# Patient Record
Sex: FEMALE | Race: WHITE | Employment: FULL TIME | URBAN - METROPOLITAN AREA
[De-identification: names, ages, dates, MRNs, and addresses within clinical notes are randomized per-mention and may not be internally consistent; named-entity substitution may affect disease eponyms.]

---

## 2017-06-02 DIAGNOSIS — R61 GENERALIZED HYPERHIDROSIS: ICD-10-CM

## 2017-06-02 NOTE — LETTER
[facility]                                             [address] [place]  [address]    June 6, 2017    Alexandra Reyna  [address] [facility] [address]    Dear Alexandra,       We recently received a refill request for Drysol. .  We have refilled this for a one time 90 day supply only because you are due for a:    physical office visit      Please call at your earliest convenience so that there will not be a delay with your future refills.          Thank you,   Your [facility] Care Team  [phone]

## 2017-06-02 NOTE — TELEPHONE ENCOUNTER
Pending Prescriptions:                       Disp   Refills    aluminum chloride (DRYSOL) 20 % external *35 mL  prn          Lynne Bravo MA

## 2017-07-06 ENCOUNTER — TELEPHONE (OUTPATIENT)
Dept: PEDIATRICS | Facility: CLINIC | Age: 22
End: 2017-07-06

## 2017-07-06 NOTE — TELEPHONE ENCOUNTER
Please call the patient. She has questions about her immunizations and if she is missing anything prior to starting school.     Thank you.

## 2017-07-07 NOTE — TELEPHONE ENCOUNTER
Schedule patient for Mantoux and second chicken pox per Patient. Patients will mention to Nurse at the time of visit about the two step testing for  mantoux.    Yadi Bhatt MA

## 2017-07-11 ENCOUNTER — ALLIED HEALTH/NURSE VISIT (OUTPATIENT)
Dept: NURSING | Facility: CLINIC | Age: 22
End: 2017-07-11
Payer: COMMERCIAL

## 2017-07-11 DIAGNOSIS — Z11.1 SCREENING EXAMINATION FOR PULMONARY TUBERCULOSIS: ICD-10-CM

## 2017-07-11 DIAGNOSIS — Z02.0 ENCOUNTER FOR SCHOOL EXAMINATION: Primary | ICD-10-CM

## 2017-07-11 PROCEDURE — 86480 TB TEST CELL IMMUN MEASURE: CPT | Performed by: FAMILY MEDICINE

## 2017-07-11 PROCEDURE — 99207 ZZC NO CHARGE NURSE ONLY: CPT

## 2017-07-11 PROCEDURE — 36415 COLL VENOUS BLD VENIPUNCTURE: CPT | Performed by: FAMILY MEDICINE

## 2017-07-11 NOTE — MR AVS SNAPSHOT
After Visit Summary   7/11/2017    Alexandra Reyna    MRN: [id number]           Patient Information     Date Of Birth          1995        Visit Information        Provider Department      7/11/2017 9:00 AM AN ANCILLARY [facility]        Today's Diagnoses     Encounter for school examination    -  1    Screening examination for pulmonary tuberculosis           Follow-ups after your visit        Your next 10 appointments already scheduled     Jul 13, 2017  9:00 AM CDT   Nurse Only with AN ANCILLARY   [facility]    [address] [facility] [address] [phone]              Who to contact     If you have questions or need follow up information about today's clinic visit or your schedule please contact [facility] directly at [phone].  Normal or non-critical lab and imaging results will be communicated to you by MyChart, letter or phone within 4 business days after the clinic has received the results. If you do not hear from us within 7 days, please contact the clinic through Chumbakhart or phone. If you have a critical or abnormal lab result, we will notify you by phone as soon as possible.  Submit refill requests through Lailaihui or call your pharmacy and they will forward the refill request to us. Please allow 3 business days for your refill to be completed.          Additional Information About Your Visit        MyChart Information     Lail[employer]hui gives you secure access to your electronic health record. If you see a primary care provider, you can also send messages to your care team and make appointments. If you have questions, please call your primary care clinic.  If you do not have a primary care provider, please call [phone] and they will assist you.        Care EveryWhere ID     This is your Care EveryWhere ID. This could be used by other organizations to access your [facility] medical records  [id number]          Blood Pressure from Last 3 Encounters:   08/30/16 105/66   05/23/16 119/76   11/10/15 119/79    Weight from Last 3 Encounters:   08/30/16 114 lb (51.7 kg)   05/23/16 114 lb (51.7 kg)   11/10/15 105 lb (47.6 kg) (8 %)*     * Growth percentiles are based on [facility] 2-20 Years data.              We Performed the Following     M Tuberculosis by Quantiferon        Primary Care Provider Office Phone # Fax #    Suha Wu [phone]       [facility] [address] [facility] [address]        Equal Access to Services     JASON HERNANDEZ : Hadii citlaly Little, wajosefda luemiladaha, qaybta kaalmada adedeisi, lester aden . So [facility] [phone].    ATENCIÓN: Si habla español, tiene a valles disposición servicios gratuitos de asistencia lingüística. Llame al [phone].    We comply with applicable federal civil rights laws and [place] laws. We do not discriminate on the basis of race, color, national origin, age, disability sex, sexual orientation or gender identity.            Thank you!     Thank you for choosing [facility]  for your care. Our goal is always to provide you with excellent care. Hearing back from our patients is one way we can continue to improve our services. Please take a few minutes to complete the written survey that you may receive in the mail after your visit with us. Thank you!             Your Updated Medication List - Protect others around you: Learn how to safely use, store and throw away your medicines at www.disposemymeds.org.          This list is accurate as of: 7/11/17 10:18 AM.  Always use your most recent med list.                   Brand Name Dispense Instructions for use Diagnosis    aluminum chloride 20 % external solution    DRYSOL    35 mL    Apply  topically At Bedtime.    Generalized hyperhidrosis       Aluminum Chloride in Alcohol 6.25 % Soln     1 Bottle    Externally apply topically nightly as  needed    Generalized hyperhidrosis       ciclopirox 8 % Soln     1 Bottle    Apply every day x 8 weeks.  First day of each week, use nail file to remove old medication and roughen nail surface.    Onychomycosis       drospirenone-ethinyl estradiol 3-0.03 MG per tablet    JOSE    84 tablet    Take 1 tablet by mouth daily    Anovulation

## 2017-07-11 NOTE — NURSING NOTE
Patient needed TB GOLD for nursing school. Printed all her titers for her as well. Patient said she got her second Varivax at [facility] as an employee. I told her needed to call her HR and get the record.  I will call her when TB GOLD test results are back.  Rosalba Hodges MA

## 2017-07-12 LAB
M TB TUBERC IFN-G BLD QL: NEGATIVE
M TB TUBERC IFN-G/MITOGEN IGNF BLD: 0 IU/ML

## 2017-08-15 ENCOUNTER — OFFICE VISIT (OUTPATIENT)
Dept: OBGYN | Facility: CLINIC | Age: 22
End: 2017-08-15
Payer: COMMERCIAL

## 2017-08-15 VITALS
BODY MASS INDEX: 18.84 KG/M2 | WEIGHT: 117.2 LBS | HEART RATE: 100 BPM | TEMPERATURE: 98.1 F | HEIGHT: 66 IN | DIASTOLIC BLOOD PRESSURE: 72 MMHG | SYSTOLIC BLOOD PRESSURE: 110 MMHG

## 2017-08-15 DIAGNOSIS — Z01.419 ENCOUNTER FOR GYNECOLOGICAL EXAMINATION WITHOUT ABNORMAL FINDING: Primary | ICD-10-CM

## 2017-08-15 DIAGNOSIS — N97.0 ANOVULATION: ICD-10-CM

## 2017-08-15 DIAGNOSIS — Z30.09 BIRTH CONTROL COUNSELING: ICD-10-CM

## 2017-08-15 PROCEDURE — 99395 PREV VISIT EST AGE 18-39: CPT | Performed by: NURSE PRACTITIONER

## 2017-08-15 PROCEDURE — G0145 SCR C/V CYTO,THINLAYER,RESCR: HCPCS | Performed by: NURSE PRACTITIONER

## 2017-08-15 RX ORDER — DROSPIRENONE AND ETHINYL ESTRADIOL 0.03MG-3MG
1 KIT ORAL DAILY
Qty: 84 TABLET | Refills: 3 | Status: SHIPPED | OUTPATIENT
Start: 2017-08-15 | End: 2018-07-12

## 2017-08-15 ASSESSMENT — PAIN SCALES - GENERAL: PAINLEVEL: NO PAIN (0)

## 2017-08-15 NOTE — MR AVS SNAPSHOT
"              After Visit Summary   8/15/2017    Alexandra Reyna    MRN: [id number]           Patient Information     Date Of Birth          1995        Visit Information        Provider Department      8/15/2017 8:50 AM Julia Rojas APRN CNP [facility]        Today's Diagnoses     Encounter for gynecological examination without abnormal finding    -  1    Anovulation        Birth control counseling           Follow-ups after your visit        Who to contact     If you have questions or need follow up information about today's clinic visit or your schedule please contact [facility] directly at [phone].  Normal or non-critical lab and imaging results will be communicated to you by MyChart, letter or phone within 4 business days after the clinic has received the results. If you do not hear from us within 7 days, please contact the clinic through Inaurahart or phone. If you have a critical or abnormal lab result, we will notify you by phone as soon as possible.  Submit refill requests through [employer] or call your pharmacy and they will forward the refill request to us. Please allow 3 business days for your refill to be completed.          Additional Information About Your Visit        MyChart Information     [employer] gives you secure access to your electronic health record. If you see a primary care provider, you can also send messages to your care team and make appointments. If you have questions, please call your primary care clinic.  If you do not have a primary care provider, please call [phone] and they will assist you.        Care EveryWhere ID     This is your Care EveryWhere ID. This could be used by other organizations to access your [facility] medical records  [id number]        Your Vitals Were     Pulse Temperature Height Last Period BMI (Body Mass Index)       100 98.1  F (36.7  C) (Oral) 5' 5.5\" (1.664 m) 07/29/2017 (Exact Date) 19.21 kg/m2        Blood " Pressure from Last 3 Encounters:   08/15/17 110/72   08/30/16 105/66   05/23/16 119/76    Weight from Last 3 Encounters:   08/15/17 117 lb 3.2 oz (53.2 kg)   08/30/16 114 lb (51.7 kg)   05/23/16 114 lb (51.7 kg)              We Performed the Following     Pap imaged thin layer screen only - recommended age 21 - 24 years          Today's Medication Changes          These changes are accurate as of: 8/15/17  9:27 AM.  If you have any questions, ask your nurse or doctor.               Stop taking these medicines if you haven't already. Please contact your care team if you have questions.     ciclopirox 8 % Soln   Stopped by:  Julia Rojas APRN CNP                Where to get your medicines      These medications were sent to [facility] - [address] [address]     Phone:  [phone]     drospirenone-ethinyl estradiol 3-0.03 MG per tablet                Primary Care Provider Office Phone # Fax #    Suha Wu [phone] [address] [facility] [address]        Equal Access to Services     JASON HERNANDEZ : Hadii citlaly hanson hadasho Soomaali, waaxda luqadaha, qaybta kaalmada adeegyada, lester rosas. So [facility] [phone].    ATENCIÓN: Si habla español, tiene a valles disposición servicios gratuitos de asistencia lingüística. Llame al [phone].    We comply with applicable federal civil rights laws and [place] laws. We do not discriminate on the basis of race, color, national origin, age, disability sex, sexual orientation or gender identity.            Thank you!     Thank you for choosing [facility]  for your care. Our goal is always to provide you with excellent care. Hearing back from our patients is one way we can continue to improve our services. Please take a few minutes to complete the written survey that you may receive in the mail after your visit with us. Thank you!              Your Updated Medication List - Protect others around you: Learn how to safely use, store and throw away your medicines at www.disposemymeds.org.          This list is accurate as of: 8/15/17  9:27 AM.  Always use your most recent med list.                   Brand Name Dispense Instructions for use Diagnosis    aluminum chloride 20 % external solution    DRYSOL    35 mL    Apply  topically At Bedtime.    Generalized hyperhidrosis       Aluminum Chloride in Alcohol 6.25 % Soln     1 Bottle    Externally apply topically nightly as needed    Generalized hyperhidrosis       drospirenone-ethinyl estradiol 3-0.03 MG per tablet    JOSE    84 tablet    Take 1 tablet by mouth daily    Anovulation, Birth control counseling

## 2017-08-15 NOTE — NURSING NOTE
"Chief Complaint   Patient presents with     Physical       Initial /72  Pulse 100  Temp 98.1  F (36.7  C) (Oral)  Ht 5' 5.5\" (1.664 m)  Wt 117 lb 3.2 oz (53.2 kg)  LMP 07/29/2017 (Exact Date)  BMI 19.21 kg/m2 Estimated body mass index is 19.21 kg/(m^2) as calculated from the following:    Height as of this encounter: 5' 5.5\" (1.664 m).    Weight as of this encounter: 117 lb 3.2 oz (53.2 kg)..  BP completed using cuff size: caryn Fletcher CMA    "

## 2017-08-15 NOTE — PROGRESS NOTES
S: Pt is a 22 year old  0 para 0 who presents today for an annual female exam. LMP: 17. Contraception: Pill. Started oral contraceptive pills for management of anovulation and now also using for contraception. Not great at remembering pills regularly, wants to discuss alternate options.  Declines STD screening. Last Pap smear: no previous. Immunizations reviewed. Dental Exams: regular. Diet and calcium reviewed. Exercise: nothing regular.    Past Medical History:   Diagnosis Date     Anovulation      Past Surgical History:   Procedure Laterality Date     C ORAL SURGERY PROCEDURE      [place] Teeth-bottom 2 only     Social History   Substance Use Topics     Smoking status: Never Smoker     Smokeless tobacco: Never Used     Alcohol use No         REVIEW OF SYSTEMS:  CONSTITUTIONAL:NEGATIVE for fever, chills, change in weight  EYES: NEGATIVE for vision changes or irritation  ENT/MOUTH: NEGATIVE for ear, mouth and throat problems  RESP:NEGATIVE for significant cough or SOB  CV: NEGATIVE for chest pain, palpitations or peripheral edema  GI: NEGATIVE for nausea, abdominal pain, heartburn, or change in bowel habits  Periods are regular on oral contraceptive pills, Cyclic symptoms include none. No intermenstrual bleeding.  MUSCULOSKELATAL:NEGATIVE for significant arthralgias or myalgia  INTEGUMENTARY/SKIN: NEGATIVE for worrisome rashes, moles or lesions  NEURO: NEGATIVE for weakness, dizziness or paresthesias  ENDOCRINE: NEGATIVE for temperature intolerance, skin/hair changes  HEME/ALLERGY/IMMUNE: NEGATIVE for bleeding problems  PSYCHIATRIC: NEGATIVE for changes in mood or affect      OBJECTIVE: This is a well appearing female in no acute distress. Answers questions and maintains eye contact appropriately. Vital signs noted.     EXAM:  EYES: Eyes grossly normal to inspection, PERRL and conjunctivae and sclerae normal  HENT: ear canals and TM's normal and nose and mouth without ulcers or lesions  NECK: no  adenopathy, no asymmetry, masses, or scars and thyroid normal to palpation  RESP: lungs clear to auscultation - no rales, rhonchi or wheezes  CV: regular rates and rhythm, normal S1 S2, no S3 or S4 and no murmur, click or rub  LYMPH: normal ant/post cervical and supraclavicular nodes  ABD/GI: soft, nontender, without hepatosplenomegaly or masses  MS: extremities normal- no gross deformities noted  SKIN: no suspicious lesions or rashes  NEURO: Normal strength and tone, mentation intact and speech normal  PSYCH: mentation appears normal and affect normal/bright  Breast exam: Breasts are symmetrical without masses, lymphadenopathy, retraction, dimpling, or nipple discharge bilaterally.  Pelvic Exam: External genitalia without visible lesions or discharge. Normal BUS. Vaginal mucosa pink, rugated, moist, without lesions or discharge. Cervix is pink, nulliparous, midline, without cervical motion tenderness. Pap smear is obtained. Uterus normal size and shape without tenderness or masses. Adnexa without masses or tenderness bilaterally.    A/P:  1) Normal annual female exam. Health maintenance updated. Regular physical activity and healthy diet encouraged. Continue regular dental exams. Encouraged adequate calcium intake.  2) Contraception Counseling. Using oral contraceptive pills also for management of anovulatory cycles. We reviewed alternate options including oral contraceptive pills, transdermal patches, vaginal ring, Depo Provera, Nexplanon. At this time she is unsure how she would like to proceed. Will send prescription for refill on oral contraceptive pills for 1 year, if alternate desired, will call. She is most interested in Depo Provera and we did do all education on this, to call for orders if desired.    Julia SALINAS CNP

## 2017-08-17 LAB
COPATH REPORT: NORMAL
PAP: NORMAL

## 2018-07-12 DIAGNOSIS — N97.0 ANOVULATION: ICD-10-CM

## 2018-07-12 DIAGNOSIS — Z30.09 BIRTH CONTROL COUNSELING: ICD-10-CM

## 2018-07-12 NOTE — LETTER
[facility]  [address] [facility] [address]  Phone: [phone]    07/13/18    Alexandra Reyna  [address] [id number]  [facility] [address]      Dear Alexandra-    Regarding a recent refill request we received- one refill was sent to the pharmacy.  You are due to be seen in clinic 8/15/18 or after for your annual exam.  Please contact our office to schedule this appointment before your next refill is due.     Sincerely,      BRAD Paris CNP

## 2018-07-13 RX ORDER — DROSPIRENONE AND ETHINYL ESTRADIOL 0.03MG-3MG
1 KIT ORAL DAILY
Qty: 84 TABLET | Refills: 0 | Status: SHIPPED | OUTPATIENT
Start: 2018-07-13 | End: 2019-01-10

## 2018-07-13 NOTE — TELEPHONE ENCOUNTER
Patient's last AFE with Julia Rojas on 8/15/17.  Patient will be due for AFE 8/15/18 or after.  Forwarded to provider to advise- then will send letter.

## 2018-09-25 ENCOUNTER — OFFICE VISIT (OUTPATIENT)
Dept: OBGYN | Facility: CLINIC | Age: 23
End: 2018-09-25
Payer: COMMERCIAL

## 2018-09-25 VITALS
SYSTOLIC BLOOD PRESSURE: 86 MMHG | HEART RATE: 97 BPM | OXYGEN SATURATION: 100 % | TEMPERATURE: 99.1 F | BODY MASS INDEX: 19.34 KG/M2 | WEIGHT: 118 LBS | DIASTOLIC BLOOD PRESSURE: 59 MMHG

## 2018-09-25 DIAGNOSIS — R11.0 NAUSEA: ICD-10-CM

## 2018-09-25 DIAGNOSIS — R10.31 RLQ ABDOMINAL PAIN: Primary | ICD-10-CM

## 2018-09-25 LAB
ALBUMIN SERPL-MCNC: 3.5 G/DL (ref 3.4–5)
ALP SERPL-CCNC: 54 U/L (ref 40–150)
ALT SERPL W P-5'-P-CCNC: 17 U/L (ref 0–50)
ANION GAP SERPL CALCULATED.3IONS-SCNC: 6 MMOL/L (ref 3–14)
AST SERPL W P-5'-P-CCNC: 13 U/L (ref 0–45)
BETA HCG QUAL IFA URINE: NEGATIVE
BILIRUB SERPL-MCNC: 0.6 MG/DL (ref 0.2–1.3)
BUN SERPL-MCNC: 8 MG/DL (ref 7–30)
CALCIUM SERPL-MCNC: 8.7 MG/DL (ref 8.5–10.1)
CHLORIDE SERPL-SCNC: 107 MMOL/L (ref 94–109)
CO2 SERPL-SCNC: 26 MMOL/L (ref 20–32)
CREAT SERPL-MCNC: 0.75 MG/DL (ref 0.52–1.04)
ERYTHROCYTE [DISTWIDTH] IN BLOOD BY AUTOMATED COUNT: 12.3 % (ref 10–15)
GFR SERPL CREATININE-BSD FRML MDRD: >90 ML/MIN/1.7M2
GLUCOSE SERPL-MCNC: 114 MG/DL (ref 70–99)
HCT VFR BLD AUTO: 44.7 % (ref 35–47)
HGB BLD-MCNC: 15.2 G/DL (ref 11.7–15.7)
MCH RBC QN AUTO: 29.7 PG (ref 26.5–33)
MCHC RBC AUTO-ENTMCNC: 34 G/DL (ref 31.5–36.5)
MCV RBC AUTO: 87 FL (ref 78–100)
PLATELET # BLD AUTO: 288 10E9/L (ref 150–450)
POTASSIUM SERPL-SCNC: 3.9 MMOL/L (ref 3.4–5.3)
PROT SERPL-MCNC: 7.4 G/DL (ref 6.8–8.8)
RBC # BLD AUTO: 5.12 10E12/L (ref 3.8–5.2)
SODIUM SERPL-SCNC: 139 MMOL/L (ref 133–144)
WBC # BLD AUTO: 5.9 10E9/L (ref 4–11)

## 2018-09-25 PROCEDURE — 84703 CHORIONIC GONADOTROPIN ASSAY: CPT | Performed by: NURSE PRACTITIONER

## 2018-09-25 PROCEDURE — 80053 COMPREHEN METABOLIC PANEL: CPT | Performed by: NURSE PRACTITIONER

## 2018-09-25 PROCEDURE — 99213 OFFICE O/P EST LOW 20 MIN: CPT | Performed by: NURSE PRACTITIONER

## 2018-09-25 PROCEDURE — 36415 COLL VENOUS BLD VENIPUNCTURE: CPT | Performed by: NURSE PRACTITIONER

## 2018-09-25 PROCEDURE — 85027 COMPLETE CBC AUTOMATED: CPT | Performed by: NURSE PRACTITIONER

## 2018-09-25 RX ORDER — METOCLOPRAMIDE 10 MG/1
10 TABLET ORAL 4 TIMES DAILY PRN
Qty: 20 TABLET | Refills: 0 | Status: SHIPPED | OUTPATIENT
Start: 2018-09-25 | End: 2019-01-10

## 2018-09-25 ASSESSMENT — PAIN SCALES - GENERAL: PAINLEVEL: SEVERE PAIN (6)

## 2018-09-25 NOTE — LETTER
[facility]  [address] [address]  [phone]      RE: Alexandra Reyna  : 1995    DATE: 2018      To Whom It May Concern,        Alexandra Reyna was seen in the clinic today. Please excuse her absence from work on 2018.        Thank you.      Sincerely,            Julia SALINAS CNP

## 2018-09-25 NOTE — MR AVS SNAPSHOT
After Visit Summary   9/25/2018    Alexandra Reyna    MRN: [id number]           Patient Information     Date Of Birth          1995        Visit Information        Provider Department      9/25/2018 10:50 AM Julia Rojas APRN CNP [facility]        Today's Diagnoses     Dysmenorrhea    -  1    Nausea           Follow-ups after your visit        Who to contact     If you have questions or need follow up information about today's clinic visit or your schedule please contact [facility] directly at [phone].  Normal or non-critical lab and imaging results will be communicated to you by Wis.dmhart, letter or phone within 4 business days after the clinic has received the results. If you do not hear from us within 7 days, please contact the clinic through [employer]Pag[employer] or phone. If you have a critical or abnormal lab result, we will notify you by phone as soon as possible.  Submit refill requests through [employer] or call your pharmacy and they will forward the refill request to us. Please allow 3 business days for your refill to be completed.          Additional Information About Your Visit        MyChart Information     [employer] gives you secure access to your electronic health record. If you see a primary care provider, you can also send messages to your care team and make appointments. If you have questions, please call your primary care clinic.  If you do not have a primary care provider, please call [phone] and they will assist you.        Care EveryWhere ID     This is your Care EveryWhere ID. This could be used by other organizations to access your [place] medical records  [id number]        Your Vitals Were     Pulse Last Period Pulse Oximetry Breastfeeding? BMI (Body Mass Index)       101 09/23/2018 (Exact Date) 98% No 19.34 kg/m2        Blood Pressure from Last 3 Encounters:   09/25/18 124/87   08/15/17 110/72   08/30/16 105/66    Weight from Last 3  Encounters:   09/25/18 118 lb (53.5 kg)   08/15/17 117 lb 3.2 oz (53.2 kg)   08/30/16 114 lb (51.7 kg)              We Performed the Following     Beta HCG Qual, Urine - [facility] and [place] ([id number])     CBC with platelets          Today's Medication Changes          These changes are accurate as of 9/25/18 12:01 PM.  If you have any questions, ask your nurse or doctor.               Start taking these medicines.        Dose/Directions    metoclopramide 10 MG tablet   Commonly known as:  REGLAN   Used for:  Nausea   Started by:  Julia Rojas APRN CNP        Dose:  10 mg   Take 1 tablet (10 mg) by mouth 4 times daily as needed (nausea)   Quantity:  20 tablet   Refills:  0            Where to get your medicines      These medications were sent to [facility] [address] [facility], [address]  [address] [address], [facility] [address]     Phone:  [phone]     metoclopramide 10 MG tablet                Primary Care Provider Office Phone # Fax #    [facility] [phone] [address] [facility] [address]        Equal Access to Services     JASON HERNANDEZ AH: Hadii citlaly hanson hadasho Soomaali, waaxda luqadaha, qaybta kaalmada adeegyada, lester rosas. So [facility] [phone].    ATENCIÓN: Si habla español, tiene a valles disposición servicios gratuitos de asistencia lingüística. Llame al [phone].    We comply with applicable federal civil rights laws and [place] laws. We do not discriminate on the basis of race, color, national origin, age, disability, sex, sexual orientation, or gender identity.            Thank you!     Thank you for choosing [facility]  for your care. Our goal is always to provide you with excellent care. Hearing back from our patients is one way we can continue to improve our services. Please take a few minutes to complete the written survey that you may receive in the mail after your  visit with us. Thank you!             Your Updated Medication List - Protect others around you: Learn how to safely use, store and throw away your medicines at www.disposemymeds.org.          This list is accurate as of 9/25/18 12:01 PM.  Always use your most recent med list.                   Brand Name Dispense Instructions for use Diagnosis    aluminum chloride 20 % external solution    DRYSOL    35 mL    Apply  topically At Bedtime.    Generalized hyperhidrosis       Aluminum Chloride in Alcohol 6.25 % Soln     1 Bottle    Externally apply topically nightly as needed    Generalized hyperhidrosis       drospirenone-ethinyl estradiol 3-0.03 MG per tablet    OCELLA    84 tablet    Take 1 tablet by mouth daily Needs to be seen for further refills.    Anovulation, Birth control counseling       metoclopramide 10 MG tablet    REGLAN    20 tablet    Take 1 tablet (10 mg) by mouth 4 times daily as needed (nausea)    Nausea

## 2018-09-25 NOTE — PROGRESS NOTES
SUBJECTIVE:   Alexandra Reyna is a 23 year old female who presents to clinic today for the following health issues:    Lower abdominal cramping and Nausea    Menses began 2 days ago. Normal expected time and flow. Yesterday morning, started having more cramping than normal for her, took Tylenol with minimal relief. Around that time also had a temp of 100.0. Tylenol did help the fever. Took another dose of Tylenol last night. Period today is essentially done, usually lasts 1 more day.   Feeling more run down, body aches, nausea and light headed. No vomiting, abnormal vaginal discharge, itching, odor or urinary symptoms. No fever today. Appetite is normal and she has been maintaining hydration-though has not eaten yet today and minimal sips of water as she was not sure if any fasting labs would be needed. No recent contacts to illness, nobody at home is sick. No bowel changes. Due to start new pill pack tomorrow.  Does relay she missed a few pills in the last pack, various times in the pack, always took the pill the next day.  Today, has been feeling better overall.    Problem list and histories reviewed & adjusted, as indicated.  Additional history: as documented    Patient Active Problem List   Diagnosis     Irregular menses-Anovulation     Chronic thoracic spine pain     Generalized hyperhidrosis     Past Surgical History:   Procedure Laterality Date     C ORAL SURGERY PROCEDURE      [place] Teeth-bottom 2 only       Social History   Substance Use Topics     Smoking status: Never Smoker     Smokeless tobacco: Never Used     Alcohol use No     Family History   Problem Relation Age of Onset     Hypertension Maternal Grandmother            Reviewed and updated as needed this visit by clinical staff  Tobacco  Allergies  Meds  Problems  Med Hx  Surg Hx  Fam Hx  Soc Hx        Reviewed and updated as needed this visit by Provider  Tobacco  Allergies  Meds  Problems  Med Hx  Surg Hx  Fam Hx  Soc Hx           ROS:  Constitutional, HEENT, cardiovascular, pulmonary, gi and gu systems are negative, except as otherwise noted.    OBJECTIVE:     /87 (BP Location: Right arm, Cuff Size: Adult Regular)  Pulse 101  Wt 118 lb (53.5 kg)  LMP 09/23/2018 (Exact Date)  SpO2 98%  Breastfeeding? No  BMI 19.34 kg/m2  Body mass index is 19.34 kg/(m^2).  GENERAL: healthy, alert and no distress, slight pallor  HENT: ear canals and TM's normal, nose and mouth without ulcers or lesions  NECK: no adenopathy, no asymmetry, masses, or scars and thyroid normal to palpation  RESP: lungs clear to auscultation - no rales, rhonchi or wheezes  CV: regular rate and rhythm, normal S1 S2, no S3 or S4, no murmur, click or rub, no peripheral edema and peripheral pulses strong  ABDOMEN: soft, non-tender except mild tenderness to palpation right side mostly lateral to umbilicus, no hepatosplenomegaly, no masses and bowel sounds normal. No rebounding or guarding.  Vulva: No external lesions, normal hair distribution, no adenopathy  BUS:  Normal, no masses noted  Patient declined speculum exam  Bimanual:  Cervix: Without cervical motion tenderness.  Uterus: Normal size and shape, non-tender, mobile  Ovaries: No masses, non-tender, mobile  MS: no gross musculoskeletal defects noted, no edema  SKIN: no suspicious lesions or rashes  PSYCH: mentation appears normal, affect normal/bright    Diagnostic Test Results:  Results for orders placed or performed in visit on 09/25/18 (from the past 24 hour(s))   CBC with platelets   Result Value Ref Range    WBC 5.9 4.0 - 11.0 10e9/L    RBC Count 5.12 3.8 - 5.2 10e12/L    Hemoglobin 15.2 11.7 - 15.7 g/dL    Hematocrit 44.7 35.0 - 47.0 %    MCV 87 78 - 100 fl    MCH 29.7 26.5 - 33.0 pg    MCHC 34.0 31.5 - 36.5 g/dL    RDW 12.3 10.0 - 15.0 %    Platelet Count 288 150 - 450 10e9/L   Beta HCG Qual, Urine - [facility] and [place] ([id number])   Result Value Ref Range    Beta HCG Qual IFA Urine Negative NEG^Negative                 ASSESSMENT/PLAN:   1. RLQ abdominal pain    Discussed possible etiologies of her symptoms. Discussed possibility of viral infection in addition to menstrual cycle. Recommend NSAIDS PRN over Tylenol. Will send prescription for anti-emetic if needed. Discussed labs. CBC is normal and no exam findings consistent with possible appendicitis, but we reviewed symptoms to monitor for and when she would need to be seen emergently. Patient to start her next pack of pills tonight. Red flag signs to monitor for and report immediately discussed with patient and she verbalizes understanding. Also reviewed symptoms for which she would need to proceed directly to ED.     After patient left exam room, when to pharmacy to  prescription, as she was about to leave, became very lightheaded and was seated in a chair. Brought back to exam room after pharmacist came to notify me and repeat vitals were taken. Patient had become much more pale, shaking, chilled. She was given juice and crackers and laid on the exam table. After about 10 minutes, pallor returned to normal, shaking and chills resolved. She felt comfortable to drive, but was asked to remain in the room for an additional 10 minutes. Upon that check, was feeling better and well enough to leave clinic. We again reviewed red flag symptoms to monitor for and when to call or when she would need to proceed to ED for evaluation, will schedule ultrasound for AM.  will be with her tonight. Patient is given an opportunity to ask questions and have them answered.  - CBC with platelets, Beta HCG Qual, Urine - [facility]         and [place] ([id number]), Comprehensive         metabolic panel (BMP + Alb, Alk Phos, ALT, AST,        Total. Bili, TP), US Abdomen Complete    2. Nausea  See above  - metoclopramide (REGLAN) 10 MG tablet; Take 1 tablet (10 mg) by mouth 4 times daily as needed (nausea)  Dispense: 20 tablet; Refill: 0    BARD [facility]  [facility]

## 2018-09-26 ENCOUNTER — RADIANT APPOINTMENT (OUTPATIENT)
Dept: ULTRASOUND IMAGING | Facility: CLINIC | Age: 23
End: 2018-09-26
Attending: NURSE PRACTITIONER
Payer: COMMERCIAL

## 2018-09-26 DIAGNOSIS — R10.31 RLQ ABDOMINAL PAIN: ICD-10-CM

## 2018-09-26 PROCEDURE — 76705 ECHO EXAM OF ABDOMEN: CPT

## 2019-01-09 NOTE — PROGRESS NOTES
SUBJECTIVE:   Alexandra Reyna is a 23 year old female who presents to clinic today for the following health issues:    Rash in groin area intermittent     No menses x 3 months    Patient has a history of anovulatory menstrual cycles and usually will not get menses unless she is on hormonal contraception. Stopped her pills in late September as she ran out. No cycles since. Confident she is not pregnant. They are likely planning to start trying for pregnancy late 2019 or early 2020. For now, wants to restart her pills until they are ready to begin trying, does have questions about pregnancy as she does not ovulate regularly.  Second, has had an intermittent rash on the labia, vulva for a month. Thinks it may be related to use of vaginal wipes, though they are non scented. Is uncomfortable/itchy when she wipes, but otherwise not bothersome. Denies vaginal itching, discharge, odor. No STI concerns.     Problem list and histories reviewed & adjusted, as indicated.  Additional history: as documented    Patient Active Problem List   Diagnosis     Irregular menses-Anovulation     Chronic thoracic spine pain     Generalized hyperhidrosis     Past Surgical History:   Procedure Laterality Date     C ORAL SURGERY PROCEDURE      [place] Teeth-bottom 2 only       Social History     Tobacco Use     Smoking status: Never Smoker     Smokeless tobacco: Never Used   Substance Use Topics     Alcohol use: No     Alcohol/week: 0.0 oz     Family History   Problem Relation Age of Onset     Hypertension Maternal Grandmother            Reviewed and updated as needed this visit by clinical staff       Reviewed and updated as needed this visit by Provider         ROS:  Constitutional, HEENT, cardiovascular, pulmonary, gi and gu systems are negative, except as otherwise noted.    OBJECTIVE:     /64 (BP Location: Right arm, Patient Position: Sitting, Cuff Size: Adult Regular)   Pulse 79   Temp 98  F (36.7  C) (Oral)   Ht 1.638 m (5'  "4.5\")   Wt 53.5 kg (118 lb)   LMP 10/10/2018 (Approximate)   SpO2 99%   BMI 19.94 kg/m    Body mass index is 19.94 kg/m .  GENERAL: healthy, alert and no distress  RESP: lungs clear to auscultation - no rales, rhonchi or wheezes  CV: regular rate and rhythm, normal S1 S2, no S3 or S4, no murmur, click or rub, no peripheral edema and peripheral pulses strong  ABDOMEN: soft, nontender, no hepatosplenomegaly, no masses and bowel sounds normal   (female): external genitalia-erythema noted across the labia majora extending down perineum. No lesions or lacerations. Normal urethral meatus and vaginal mucosa pink, moist, well rugated  MS: no gross musculoskeletal defects noted, no edema  PSYCH: mentation appears normal, affect normal/bright    ASSESSMENT/PLAN:   1. Anovulation  Restart [place] at this time. Discussed plans for pregnancy and questions related to anovulation. Recommend once she stops contraception and they are ready to begin actively attempting pregnancy, she plan a visit to discuss use of ovulation stimulating medications such as Clomid given her history. Patient is given an opportunity to ask questions and have them answered.  - drospirenone-ethinyl estradiol (OCELLA) 3-0.03 MG tablet; Take 1 tablet by mouth daily  Dispense: 84 tablet; Refill: 3    2. Vulvar irritation  Discussed exam findings. Will try topical ointment and additional home care relief measures she can try reviewed. Return to clinic PRN.  - nystatin-triamcinolone (MYCOLOG) 651664-3.1 UNIT/GM-% external ointment; Apply topically 2 times daily  Dispense: 30 g; Refill: 0    BRAD [facility]  I have reviewed this encounter and agree.  Ben Cardona MD FACOG      "

## 2019-01-10 ENCOUNTER — OFFICE VISIT (OUTPATIENT)
Dept: OBGYN | Facility: CLINIC | Age: 24
End: 2019-01-10
Payer: COMMERCIAL

## 2019-01-10 VITALS
SYSTOLIC BLOOD PRESSURE: 111 MMHG | HEART RATE: 79 BPM | HEIGHT: 65 IN | OXYGEN SATURATION: 99 % | WEIGHT: 118 LBS | TEMPERATURE: 98 F | DIASTOLIC BLOOD PRESSURE: 64 MMHG | BODY MASS INDEX: 19.66 KG/M2

## 2019-01-10 DIAGNOSIS — N97.0 ANOVULATION: ICD-10-CM

## 2019-01-10 DIAGNOSIS — N90.89 VULVAR IRRITATION: Primary | ICD-10-CM

## 2019-01-10 PROCEDURE — 99213 OFFICE O/P EST LOW 20 MIN: CPT | Performed by: NURSE PRACTITIONER

## 2019-01-10 RX ORDER — NYSTATIN AND TRIAMCINOLONE ACETONIDE 100000; 1 [USP'U]/G; MG/G
OINTMENT TOPICAL 2 TIMES DAILY
Qty: 30 G | Refills: 0 | Status: SHIPPED | OUTPATIENT
Start: 2019-01-10 | End: 2020-02-28

## 2019-01-10 RX ORDER — DROSPIRENONE AND ETHINYL ESTRADIOL 0.03MG-3MG
1 KIT ORAL DAILY
Qty: 84 TABLET | Refills: 3 | Status: SHIPPED | OUTPATIENT
Start: 2019-01-10 | End: 2020-02-28

## 2019-01-10 ASSESSMENT — MIFFLIN-ST. JEOR: SCORE: 1288.18

## 2019-01-10 ASSESSMENT — PAIN SCALES - GENERAL: PAINLEVEL: NO PAIN (0)

## 2020-02-18 ENCOUNTER — TRANSFERRED RECORDS (OUTPATIENT)
Dept: HEALTH INFORMATION MANAGEMENT | Facility: CLINIC | Age: 25
End: 2020-02-18

## 2020-02-23 ENCOUNTER — HEALTH MAINTENANCE LETTER (OUTPATIENT)
Age: 25
End: 2020-02-23

## 2020-02-25 ENCOUNTER — OFFICE VISIT (OUTPATIENT)
Dept: URGENT CARE | Facility: URGENT CARE | Age: 25
End: 2020-02-25
Payer: COMMERCIAL

## 2020-02-25 ENCOUNTER — NURSE TRIAGE (OUTPATIENT)
Dept: FAMILY MEDICINE | Facility: CLINIC | Age: 25
End: 2020-02-25

## 2020-02-25 VITALS
TEMPERATURE: 98.6 F | OXYGEN SATURATION: 99 % | HEART RATE: 84 BPM | SYSTOLIC BLOOD PRESSURE: 122 MMHG | DIASTOLIC BLOOD PRESSURE: 84 MMHG

## 2020-02-25 DIAGNOSIS — R10.13 EPIGASTRIC PAIN: Primary | ICD-10-CM

## 2020-02-25 LAB
BASOPHILS # BLD AUTO: 0 10E9/L (ref 0–0.2)
BASOPHILS NFR BLD AUTO: 0.4 %
DIFFERENTIAL METHOD BLD: NORMAL
EOSINOPHIL # BLD AUTO: 0.1 10E9/L (ref 0–0.7)
EOSINOPHIL NFR BLD AUTO: 0.6 %
ERYTHROCYTE [DISTWIDTH] IN BLOOD BY AUTOMATED COUNT: 12.1 % (ref 10–15)
HCT VFR BLD AUTO: 44.1 % (ref 35–47)
HGB BLD-MCNC: 15.1 G/DL (ref 11.7–15.7)
LYMPHOCYTES # BLD AUTO: 2.7 10E9/L (ref 0.8–5.3)
LYMPHOCYTES NFR BLD AUTO: 33.3 %
MCH RBC QN AUTO: 30 PG (ref 26.5–33)
MCHC RBC AUTO-ENTMCNC: 34.2 G/DL (ref 31.5–36.5)
MCV RBC AUTO: 88 FL (ref 78–100)
MONOCYTES # BLD AUTO: 0.5 10E9/L (ref 0–1.3)
MONOCYTES NFR BLD AUTO: 5.5 %
NEUTROPHILS # BLD AUTO: 5 10E9/L (ref 1.6–8.3)
NEUTROPHILS NFR BLD AUTO: 60.2 %
PLATELET # BLD AUTO: 302 10E9/L (ref 150–450)
RBC # BLD AUTO: 5.04 10E12/L (ref 3.8–5.2)
WBC # BLD AUTO: 8.2 10E9/L (ref 4–11)

## 2020-02-25 PROCEDURE — 99204 OFFICE O/P NEW MOD 45 MIN: CPT | Performed by: PHYSICIAN ASSISTANT

## 2020-02-25 PROCEDURE — 80053 COMPREHEN METABOLIC PANEL: CPT | Performed by: PHYSICIAN ASSISTANT

## 2020-02-25 PROCEDURE — 83690 ASSAY OF LIPASE: CPT | Performed by: PHYSICIAN ASSISTANT

## 2020-02-25 PROCEDURE — 85025 COMPLETE CBC W/AUTO DIFF WBC: CPT | Performed by: PHYSICIAN ASSISTANT

## 2020-02-25 PROCEDURE — 36415 COLL VENOUS BLD VENIPUNCTURE: CPT | Performed by: PHYSICIAN ASSISTANT

## 2020-02-25 RX ORDER — OMEPRAZOLE 20 MG/1
20 TABLET, DELAYED RELEASE ORAL DAILY
Qty: 20 TABLET | Refills: 0 | Status: SHIPPED | OUTPATIENT
Start: 2020-02-25 | End: 2020-03-14

## 2020-02-25 NOTE — TELEPHONE ENCOUNTER
"  Additional Information    Negative: Passed out (i.e., fainted, collapsed and was not responding)    Negative: Shock suspected (e.g., cold/pale/clammy skin, too weak to stand, low BP, rapid pulse)    Negative: Sounds like a life-threatening emergency to the triager    Negative: Chest pain    Negative: Pain is mainly in upper abdomen (if needed ask: 'is it mainly above the belly button?')    Negative: Abdominal pain and pregnant > 20 weeks    Negative: Abdominal pain and pregnant < 20 weeks    Constant abdominal pain lasting > 2 hours    Negative: SEVERE abdominal pain (e.g., excruciating)    Negative: Vomiting red blood or black (coffee ground) material    Negative: Bloody, black, or tarry bowel movements    Negative: Vomiting bile (green color)    Negative: Patient sounds very sick or weak to the triager    Negative: Vomiting and abdomen looks much more swollen than usual    Negative: White of the eyes have turned yellow (i.e., jaundice)    Negative: Blood in urine (red, pink, or tea-colored)    Negative: Fever > 103 F (39.4 C)    Negative: Fever > 101 F (38.3 C) and over 60 years of age    Negative: Fever > 100.0 F (37.8 C) and has diabetes mellitus or a weak immune system (e.g., HIV positive, cancer chemotherapy, organ transplant, splenectomy, chronic steroids)    Negative: Fever > 100.0 F (37.8 C) and bedridden (e.g., nursing home patient, stroke, chronic illness, recovering from surgery)    Negative: Pregnant or could be pregnant (i.e., missed last menstrual period)    Answer Assessment - Initial Assessment Questions  1. LOCATION: \"Where does it hurt?\"       Center of upper abdomen above navel but below ribs.  2. RADIATION: \"Does the pain shoot anywhere else?\" (e.g., chest, back)      localized    3. ONSET: \"When did the pain begin?\" (e.g., minutes, hours or days ago)       Mon am. Pt has had similar sx twice in the past 1 year apart each time.    4. SUDDEN: \"Gradual or sudden onset?\"      sudden    5. PATTERN " "\"Does the pain come and go, or is it constant?\"     - If constant: \"Is it getting better, staying the same, or worsening?\"       (Note: Constant means the pain never goes away completely; most serious pain is constant and it progresses)      - If intermittent: \"How long does it last?\" \"Do you have pain now?\"      (Note: Intermittent means the pain goes away completely between bouts)      Constant if standing or walking, improves when laying down    6. SEVERITY: \"How bad is the pain?\"  (e.g., Scale 1-10; mild, moderate, or severe)    - MILD (1-3): doesn't interfere with normal activities, abdomen soft and not tender to touch     - MODERATE (4-7): interferes with normal activities or awakens from sleep, tender to touch     - SEVERE (8-10): excruciating pain, doubled over, unable to do any normal activities       Sharp pain, #5/10 now, #8/10 yesterday    7. RECURRENT SYMPTOM: \"Have you ever had this type of abdominal pain before?\" If so, ask: \"When was the last time?\" and \"What happened that time?\"       Occurred twice, 1 year apart    8. CAUSE: \"What do you think is causing the abdominal pain?\"      Possibly GERD    9. RELIEVING/AGGRAVATING FACTORS: \"What makes it better or worse?\" (e.g., movement, antacids, bowel movement)      Hurts when walking or standing up straight. Feels better when laying down.    10. OTHER SYMPTOMS: \"Has there been any vomiting, diarrhea, constipation, or urine problems?\"        None    Protocols used: ABDOMINAL PAIN - FEMALE-A-OH    "

## 2020-02-26 ENCOUNTER — TELEPHONE (OUTPATIENT)
Dept: URGENT CARE | Facility: URGENT CARE | Age: 25
End: 2020-02-26

## 2020-02-26 ENCOUNTER — ANCILLARY PROCEDURE (OUTPATIENT)
Dept: CT IMAGING | Facility: CLINIC | Age: 25
End: 2020-02-26
Attending: FAMILY MEDICINE
Payer: COMMERCIAL

## 2020-02-26 ENCOUNTER — OFFICE VISIT (OUTPATIENT)
Dept: FAMILY MEDICINE | Facility: CLINIC | Age: 25
End: 2020-02-26
Payer: COMMERCIAL

## 2020-02-26 VITALS
BODY MASS INDEX: 20.96 KG/M2 | SYSTOLIC BLOOD PRESSURE: 116 MMHG | OXYGEN SATURATION: 98 % | WEIGHT: 124 LBS | HEART RATE: 100 BPM | DIASTOLIC BLOOD PRESSURE: 83 MMHG | TEMPERATURE: 98.1 F

## 2020-02-26 DIAGNOSIS — R74.8 ELEVATED LIPASE: ICD-10-CM

## 2020-02-26 DIAGNOSIS — R10.11 RUQ ABDOMINAL PAIN: Primary | ICD-10-CM

## 2020-02-26 DIAGNOSIS — R10.11 RUQ ABDOMINAL PAIN: ICD-10-CM

## 2020-02-26 LAB
ALBUMIN SERPL-MCNC: 4 G/DL (ref 3.4–5)
ALP SERPL-CCNC: 68 U/L (ref 40–150)
ALT SERPL W P-5'-P-CCNC: 15 U/L (ref 0–50)
ANION GAP SERPL CALCULATED.3IONS-SCNC: 4 MMOL/L (ref 3–14)
AST SERPL W P-5'-P-CCNC: 11 U/L (ref 0–45)
BILIRUB SERPL-MCNC: 0.2 MG/DL (ref 0.2–1.3)
BUN SERPL-MCNC: 10 MG/DL (ref 7–30)
CALCIUM SERPL-MCNC: 9.5 MG/DL (ref 8.5–10.1)
CHLORIDE SERPL-SCNC: 104 MMOL/L (ref 94–109)
CO2 SERPL-SCNC: 29 MMOL/L (ref 20–32)
CREAT SERPL-MCNC: 0.68 MG/DL (ref 0.52–1.04)
GFR SERPL CREATININE-BSD FRML MDRD: >90 ML/MIN/{1.73_M2}
GLUCOSE SERPL-MCNC: 90 MG/DL (ref 70–99)
LIPASE SERPL-CCNC: 211 U/L (ref 73–393)
LIPASE SERPL-CCNC: 556 U/L (ref 73–393)
POTASSIUM SERPL-SCNC: 4.6 MMOL/L (ref 3.4–5.3)
PROT SERPL-MCNC: 7.6 G/DL (ref 6.8–8.8)
SODIUM SERPL-SCNC: 137 MMOL/L (ref 133–144)

## 2020-02-26 PROCEDURE — 74177 CT ABD & PELVIS W/CONTRAST: CPT | Mod: TC

## 2020-02-26 PROCEDURE — 99214 OFFICE O/P EST MOD 30 MIN: CPT | Performed by: FAMILY MEDICINE

## 2020-02-26 PROCEDURE — 36415 COLL VENOUS BLD VENIPUNCTURE: CPT | Performed by: FAMILY MEDICINE

## 2020-02-26 PROCEDURE — 83690 ASSAY OF LIPASE: CPT | Performed by: FAMILY MEDICINE

## 2020-02-26 RX ORDER — IOPAMIDOL 755 MG/ML
100 INJECTION, SOLUTION INTRAVASCULAR ONCE
Status: COMPLETED | OUTPATIENT
Start: 2020-02-26 | End: 2020-02-26

## 2020-02-26 RX ADMIN — Medication 60 ML: at 17:04

## 2020-02-26 RX ADMIN — IOPAMIDOL 76 ML: 755 INJECTION, SOLUTION INTRAVASCULAR at 17:04

## 2020-02-26 NOTE — TELEPHONE ENCOUNTER
Called patient with cmp and lipase results.  Informed of elevated lipase  Discussed that if she is still having symptoms go to ER.  She is refusing ER as she feels a lot better  Continue omeprazole and follow up in clinic within 1-2 days if she is much improved  But if with any concerns go to ER  She voiced understanding  Alarm signs or symptoms discussed, if present recommend go to ER   An Uribe M.D.

## 2020-02-26 NOTE — PROGRESS NOTES
S: 25 yo female presents for evaluation of epigastric pain since yesterday morning.  The night before had pork sausage and grilled pork for dinner.  Has been using probiotic and ginger ale without significant relief.  She rates the pain 8-9 out of 10 yesterday.  Right now it is 5 out of 10.  The pain is worse with walking or standing.  The pain seems to improve when she is supine.  She has had 2 episodes of this in the past.  One 2 years ago in the ER at which time she was diagnosed with GERD.  The other time was 2018. Given Reglan for nausea and US was negative.  No significant nausea here tonight.  No vomiting.  No fever.  She has been belching a lot.  No lightheadedness.  No blood in her stools.  No black stools.  No constipation or diarrhea.    Allergies   Allergen Reactions     Nkda [No Known Drug Allergies]        Past Medical History:   Diagnosis Date     Anovulation        drospirenone-ethinyl estradiol (OCELLA) 3-0.03 MG tablet, Take 1 tablet by mouth daily (Patient not taking: Reported on 2020)  [] nystatin-triamcinolone (MYCOLOG) 128288-1.1 UNIT/GM-% external ointment, Apply topically 2 times daily    No current facility-administered medications on file prior to visit.       Social History     Tobacco Use     Smoking status: Never Smoker     Smokeless tobacco: Never Used   Substance Use Topics     Alcohol use: No     Alcohol/week: 0.0 standard drinks       ROS:  CONSTITUTIONAL: Negative for fatigue or fever.  EYES: Negative for eye problems.  ENT: As above.  RESP: As above.  CV: Negative for chest pains.  GI: Negative for vomiting.  MUSCULOSKELETAL:  Negative for significant muscle or joint pains.  NEUROLOGIC: Negative for headaches.  SKIN: Negative for rash.  PSYCH: Normal mentation for age.    OBJECTIVE:  /84   Pulse 84   Temp 98.6  F (37  C)   SpO2 99%   GENERAL APPEARANCE: Healthy, alert and no distress.  EYES:Conjunctiva/sclera clear.  THROAT: No erythema w/o tonsillar  enlargement . No exudates.  NECK: Supple, nontender, no lymphadenopathy.  RESP: Lungs clear to auscultation - no rales, rhonchi or wheezes  CV: Regular rate and rhythm, normal S1 S2, no murmur noted.  NEURO: Awake, alert    SKIN: No rashes  Abdomen-mild epigastric tenderness.  Normal active bowel sounds.  No rigidity, guarding or rebound.  No hepatosplenomegaly.      ASSESSMENT:     ICD-10-CM    1. Epigastric pain R10.13 lidocaine (XYLOCAINE) 2 % 15 mL, alum & mag hydroxide-simethicone (MAALOX  ES) 15 mL GI Cocktail     CBC with platelets differential     Comprehensive metabolic panel (BMP + Alb, Alk Phos, ALT, AST, Total. Bili, TP)     Lipase     omeprazole (PRILOSEC OTC) 20 MG EC tablet         PLAN: GI cocktail.  Prescription for Prilosec.  Labs obtained.  Will contact with results tomorrow.  To ER in the interim if worsens or new signs or symptoms develop.  Otherwise to see primary later this week.  I have discussed clinical findings with patient.  Side effects of medications discussed.  Symptomatic care is discussed.  I have discussed the possibility of  worsening symptoms and indication to RTC or go to the ER if they occur.  All questions are answered, patient indicates understanding of these issues and is in agreement with plan.   Patient care instructions are discussed/given at the end of visit.   Lots of rest and fluids.    Melissa Fernando PA-C

## 2020-02-26 NOTE — PROGRESS NOTES
HPI:    Alexandra Ramírez is a 24 year old female here to discuss:    Abd pain - has had this a couple of times in as many years. This episode present since around 2/24/20. It is located epigastrium. The pain is rated at 8-9/10 but now 2-4/10. It is constant. It is described as burning, achy, intermittently sharp. Seems to be worse when upright, walking or when eating. Better lying down. There is metallic taste in the mouth. But It is not associated with nausea, vomiting, diarrhea, constipation, blood in stool or black stools or weight loss or fevers. Has daily BM's which seem to mostly soft. No previous history of abdominal surgery. Colonoscopy or EGD have not been done.   Evaluation and treatment:   JAY ultrasound 3/7/17 - negative.    RUQ ultrasound 9/26/18 - negative.   She was seen in urgent care 2/25/20 - GI cocktail helped - CBC, CMP were fine but the Lipase was high at 556.   Omeprazole 20 mg daily (just restarted a couple of days ago) - may be helping. I asked her to increase to bid.   We discussed the diff dx which would includes pancreatitis, gall bladder disease, gastritis or ulcer, malignancy.   Repeat Lipase today is normal.   We got abd CT which was normal.   We will get EGD.    ROS:    Const: No fevers, weight changes or night sweats recently.  ENT: No runny nose, sore throat or ear pain.  Resp: No cough or shortness of breath.  CV: No chest pain, dizziness or cardiac palpitations.  GI: No nausea, vomiting, diarrhea or constipation. Denies blood in stools or black stools.  : No dysuria, frequency or hematuria.  The rest of the ROS negative, other than listed on HPI    SH:    Profession: RN at [employer]  Tobacco: no  Etoh: none  Recreational drugs: none  Caffeine: tea    Exam:    /83   Pulse 100   Temp 98.1  F (36.7  C) (Oral)   Wt 56.2 kg (124 lb)   SpO2 98%   BMI 20.96 kg/m      Gen: Healthy appearing female in no acute distress  ENT: Oropharynx normal. Oral mucosa moist without  lesions.  Eyes: Conjunctiva and sclera normal. Pupils react normally to light. No nystagmus.  Neck: No enlarged lymph nodes, thyromegally or other masses.  Lungs: Good air movement and otherwise clear.  CV: Heart RRR with no murmurs. No JVD, carotid bruits or leg edema.  Abd: mild tenderness RUQ and epigastrium without rebound or guarding. Otherwise soft, non distended, with normal bowel sounds. No liver or spleen enlargement. No masses. No hernias.    Assessment and Plan - Decision Making    1. RUQ abdominal pain    Per HPI    - CT Abdomen Pelvis w Contrast; Future  - GASTROENTEROLOGY ADULT REF PROCEDURE ONLY  - GASTROENTEROLOGY ADULT REF CONSULT ONLY    2. Elevated lipase    Per HPI    - Lipase  - CT Abdomen Pelvis w Contrast; Future  - GASTROENTEROLOGY ADULT REF PROCEDURE ONLY  - GASTROENTEROLOGY ADULT REF CONSULT ONLY      Written instructions given as follows:    Patient Instructions   1. Set up GI appointment - [facility]: [phone]. You can cancel this if your symptoms resolve.    2. Set up CT scan - [phone].    3. Set up upper endoscopy - [phone].    4. Increase Omeprazole to 20 mg twice per day. May take Pepcid or TUMS on top of that.    5. I will contact you with results.

## 2020-02-26 NOTE — PATIENT INSTRUCTIONS
1. Set up GI appointment - [facility]: [phone]. You can cancel this if your symptoms resolve.    2. Set up CT scan - [phone].    3. Set up upper endoscopy - [phone].    4. Increase Omeprazole to 20 mg twice per day. May take Pepcid or TUMS on top of that.    5. I will contact you with results.

## 2020-02-27 ENCOUNTER — MYC MEDICAL ADVICE (OUTPATIENT)
Dept: FAMILY MEDICINE | Facility: CLINIC | Age: 25
End: 2020-02-27

## 2020-02-27 ENCOUNTER — TELEPHONE (OUTPATIENT)
Dept: FAMILY MEDICINE | Facility: CLINIC | Age: 25
End: 2020-02-27

## 2020-02-27 ENCOUNTER — MYC MEDICAL ADVICE (OUTPATIENT)
Dept: OBGYN | Facility: CLINIC | Age: 25
End: 2020-02-27

## 2020-02-27 NOTE — TELEPHONE ENCOUNTER
Clinic [facility] called today.    Patient would like to go to this organization for her referral by Dr. Fajardo at [facility].    Please fax the referral/order request to [phone].    If questions, please contact them back at [phone].  Option 1 for scheduling an appointment.    Thank you.    Central Scheduling  Nikki CHRISTIANSON

## 2020-02-27 NOTE — RESULT ENCOUNTER NOTE
Clary Morris,    The Lipase is now in the normal range. I will contact you again once I get the CT result.    Regards,    Александр Graf M.D.

## 2020-02-28 ASSESSMENT — MIFFLIN-ST. JEOR: SCORE: 1302.46

## 2020-03-05 ENCOUNTER — HOSPITAL ENCOUNTER (OUTPATIENT)
Facility: AMBULATORY SURGERY CENTER | Age: 25
Discharge: HOME | End: 2020-03-05
Attending: SURGERY | Admitting: SURGERY
Payer: COMMERCIAL

## 2020-03-05 VITALS
HEART RATE: 73 BPM | TEMPERATURE: 98.4 F | BODY MASS INDEX: 21.17 KG/M2 | HEIGHT: 64 IN | WEIGHT: 124 LBS | RESPIRATION RATE: 16 BRPM | SYSTOLIC BLOOD PRESSURE: 107 MMHG | OXYGEN SATURATION: 98 % | DIASTOLIC BLOOD PRESSURE: 71 MMHG

## 2020-03-05 DIAGNOSIS — R10.11 RUQ PAIN: Primary | ICD-10-CM

## 2020-03-05 LAB — UPPER GI ENDOSCOPY: NORMAL

## 2020-03-05 PROCEDURE — 99152 MOD SED SAME PHYS/QHP 5/>YRS: CPT | Mod: 59 | Performed by: SURGERY

## 2020-03-05 PROCEDURE — 43239 EGD BIOPSY SINGLE/MULTIPLE: CPT

## 2020-03-05 PROCEDURE — G8918 PT W/O PREOP ORDER IV AB PRO: HCPCS

## 2020-03-05 PROCEDURE — 43239 EGD BIOPSY SINGLE/MULTIPLE: CPT | Performed by: SURGERY

## 2020-03-05 PROCEDURE — G8907 PT DOC NO EVENTS ON DISCHARG: HCPCS

## 2020-03-05 PROCEDURE — 88305 TISSUE EXAM BY PATHOLOGIST: CPT | Performed by: PATHOLOGY

## 2020-03-05 RX ORDER — ONDANSETRON 4 MG/1
4 TABLET, ORALLY DISINTEGRATING ORAL EVERY 6 HOURS PRN
Status: DISCONTINUED | OUTPATIENT
Start: 2020-03-05 | End: 2020-03-06 | Stop reason: HOSPADM

## 2020-03-05 RX ORDER — FENTANYL CITRATE 50 UG/ML
INJECTION, SOLUTION INTRAMUSCULAR; INTRAVENOUS PRN
Status: DISCONTINUED | OUTPATIENT
Start: 2020-03-05 | End: 2020-03-05 | Stop reason: HOSPADM

## 2020-03-05 RX ORDER — NALOXONE HYDROCHLORIDE 0.4 MG/ML
.1-.4 INJECTION, SOLUTION INTRAMUSCULAR; INTRAVENOUS; SUBCUTANEOUS
Status: DISCONTINUED | OUTPATIENT
Start: 2020-03-05 | End: 2020-03-06 | Stop reason: HOSPADM

## 2020-03-05 RX ORDER — FLUMAZENIL 0.1 MG/ML
0.2 INJECTION, SOLUTION INTRAVENOUS
Status: SHIPPED | OUTPATIENT
Start: 2020-03-05 | End: 2020-03-05

## 2020-03-05 RX ORDER — ONDANSETRON 2 MG/ML
4 INJECTION INTRAMUSCULAR; INTRAVENOUS EVERY 6 HOURS PRN
Status: DISCONTINUED | OUTPATIENT
Start: 2020-03-05 | End: 2020-03-06 | Stop reason: HOSPADM

## 2020-03-05 RX ORDER — ONDANSETRON 4 MG/1
TABLET, ORALLY DISINTEGRATING ORAL PRN
Status: DISCONTINUED | OUTPATIENT
Start: 2020-03-05 | End: 2020-03-05 | Stop reason: HOSPADM

## 2020-03-05 NOTE — OR NURSING
Writer observed pt standing and getting ready to leave recovery. Pt was unsteady on her feet and pale. Pt stated she felt warn and nauseous. Writer had pt lie back down, put pt in Trendelenburg, cooled pt with cool cloths and gave pt a 4mg sublingual zofran tablet. Pt rested for an additional 10 min in recovery until color returned to her face and she stated she felt better. Pt is an RN. Pt was wheeled out in a W/C and was reminded she should nap when she returned home. Pt was given discharge instructions by the recovery nurse and was advised to call in if questions or concerns.

## 2020-03-05 NOTE — DISCHARGE INSTRUCTIONS
I have ordered an ultrasound for your gallbladder and will see what that shows and the biopsies of your stomach.     If you can get this done in the next week then call when you have this set up to see me at least one day from your ultrasound and I can see you in clinic to talk about this.   Call  to get an appointment.   Thanks  Humberto Weston MD

## 2020-03-05 NOTE — LETTER
[id number] [facility] [place] [address]  Alexandra Ramírez  [address]  [place] [address]  March 16, 2020    Dear Alexandra,   This letter is to inform you of the results of your pathology report on your upper endoscopy (EGD).   If you do have further questions please don t hesitate to call my assistant at [phone].  We do not have someone answering the phone all the time at my assistants number so if leave a message it may take a day or so to get back to you.  So if more urgent then call the below number.    To make an appointment call [phone]:  Your pathology report was:  FINAL DIAGNOSIS:   Stomach, Antral Biopsy:   Gastric mucosa with no significant histologic abnormality; negative for   intestinal metaplasia or dysplasia; no   H. pylori like organisms identified on routine staining   Normal looking stomach, please follow up by having a repeat upper endoscopy (EGD), if your symptoms worsen.        If you have questions, please contact your primary care doctor. No bacteria seen on the biopsies.     Sincerely,         Humberto Weston M.D.

## 2020-03-09 LAB — COPATH REPORT: NORMAL

## 2020-03-11 ENCOUNTER — ANCILLARY PROCEDURE (OUTPATIENT)
Dept: ULTRASOUND IMAGING | Facility: CLINIC | Age: 25
End: 2020-03-11
Attending: SURGERY
Payer: COMMERCIAL

## 2020-03-11 DIAGNOSIS — R10.11 RUQ PAIN: ICD-10-CM

## 2020-03-11 PROCEDURE — 76700 US EXAM ABDOM COMPLETE: CPT

## 2020-03-14 ENCOUNTER — MYC MEDICAL ADVICE (OUTPATIENT)
Dept: FAMILY MEDICINE | Facility: CLINIC | Age: 25
End: 2020-03-14

## 2020-03-14 DIAGNOSIS — R10.13 EPIGASTRIC PAIN: ICD-10-CM

## 2020-03-14 DIAGNOSIS — A04.8 H. PYLORI INFECTION: Primary | ICD-10-CM

## 2020-03-14 RX ORDER — CLARITHROMYCIN 500 MG
500 TABLET ORAL 2 TIMES DAILY
Qty: 28 TABLET | Refills: 0 | Status: SHIPPED | OUTPATIENT
Start: 2020-03-14 | End: 2020-06-05

## 2020-03-14 RX ORDER — OMEPRAZOLE 20 MG/1
20 TABLET, DELAYED RELEASE ORAL 2 TIMES DAILY
Qty: 28 TABLET | Refills: 0 | Status: SHIPPED | OUTPATIENT
Start: 2020-03-14 | End: 2020-06-05

## 2020-03-14 RX ORDER — AMOXICILLIN 500 MG/1
1000 TABLET, FILM COATED ORAL 2 TIMES DAILY
Qty: 56 TABLET | Refills: 0 | Status: SHIPPED | OUTPATIENT
Start: 2020-03-14 | End: 2020-06-05

## 2020-03-20 ENCOUNTER — MYC MEDICAL ADVICE (OUTPATIENT)
Dept: FAMILY MEDICINE | Facility: CLINIC | Age: 25
End: 2020-03-20

## 2020-03-27 ENCOUNTER — VIRTUAL VISIT (OUTPATIENT)
Dept: SURGERY | Facility: CLINIC | Age: 25
End: 2020-03-27
Payer: COMMERCIAL

## 2020-03-27 DIAGNOSIS — R74.8 ELEVATED LIPASE: ICD-10-CM

## 2020-03-27 DIAGNOSIS — R10.13 EPIGASTRIC PAIN: Primary | ICD-10-CM

## 2020-03-27 PROCEDURE — 99215 OFFICE O/P EST HI 40 MIN: CPT | Mod: TEL | Performed by: SURGERY

## 2020-03-27 NOTE — PROGRESS NOTES
"Alexandra Ramírez is a 24 year old female who is being evaluated via a billable telephone visit.      The patient has been notified of following:     \"This telephone visit will be conducted via a call between you and your physician/provider. We have found that certain health care needs can be provided without the need for a physical exam.  This service lets us provide the care you need with a short phone conversation.  If a prescription is necessary we can send it directly to your pharmacy.  If lab work is needed we can place an order for that and you can then stop by our lab to have the test done at a later time.    If during the course of the call the physician/provider feels a telephone visit is not appropriate, you will not be charged for this service.\"     Alexandra Ramírez complains of   Chief Complaint   Patient presents with     Results          I have reviewed and updated the patient's Past Medical History, Social History, Family History and Medication List.    ALLERGIES  Nkda [no known drug allergies]    Right upper quadrant pain  Alexandra is a 24 year old female who is status post EGD  with symptom of epigastric pain came back after stopping the Prilosec for 12 days and the symptoms came back and she started up the medications plus tums and the symptoms went away in  4 hours.   Patient had the symptom come back after eating pork  And fell asleep and then woke up with it.  When gets up in         Path report shows:  FINAL DIAGNOSIS:   Stomach, Antral Biopsy:   Gastric mucosa with no significant histologic abnormality; negative for   intestinal metaplasia or dysplasia; no   H. pylori like organisms identified on routine staining     US ABDOMEN COMPLETE  3/11/2020 8:53 AM     HISTORY: Right upper quadrant pain.     COMPARISON: None.     FINDINGS: The liver is unremarkable, without evidence for hepatic mass  or fatty infiltration. The gallbladder is unremarkable, without  evidence of cholelithiasis. Patient is " nontender over the gallbladder.  No intra or extrahepatic bile duct dilatation. The common duct could  not be visualized in its entirety. Pancreas is partially obscured by  overlying bowel gas, but appears unremarkable where seen. Unremarkable  spleen. Both kidneys are unremarkable. No hydronephrosis. Abdominal  aorta and IVC are segmentally seen, and are of normal caliber where  visualized.                                                                      IMPRESSION: Unremarkable abdominal ultrasound.     HALEIGH ESPARZA MD    Discussed other causes of gastritis and the  only one is she has stress.  Will think about  Possible gallstone pancreatitis .  Last ultrasound does not show any stones.   But if the pain complains of  Es back while on Prilosec will         Exam Information     Exam Date  Exam Time  Accession #  Results     2/25/20   6:45 PM  [id number]     Component  Value  Flag  Ref Range  Units  Status  Collected  Lab    Lipase  556  High    73 - 393  U/L  Final  02/25/2020  6:45 PM  [facility]    CT ABDOMEN PELVIS W CONTRAST 2/26/2020 5:06 PM     CLINICAL HISTORY: RUQ abd pain, elevated lipase; RUQ abdominal pain;  Elevated lipase     TECHNIQUE: CT scan of the abdomen and pelvis was performed following  injection of IV contrast. Multiplanar reformats were obtained. Dose  reduction techniques were used.  CONTRAST: 76mL Isovue-370     COMPARISON: 9/26/2018 ultrasound     FINDINGS:   LOWER CHEST: Normal.     HEPATOBILIARY: Normal.     PANCREAS: No significant mass, duct dilatation, or inflammatory  change. No adjacent fluid identified.     SPLEEN: Normal.     ADRENAL GLANDS: Normal.     KIDNEYS/BLADDER: Normal.     BOWEL: Normal.     PELVIC ORGANS: Normal appearance of the uterus. There is a 17 mm  low-attenuation left adnexal lesion. No right adnexal lesion.     ADDITIONAL FINDINGS: None.     MUSCULOSKELETAL: Normal.                                                                      IMPRESSION:   1.   Normal CT appearance of the pancreas.  2.  Nondistended gallbladder. No bile duct dilatation. Normal  appearance of the liver.  3.  Physiologic left adnexal lesion. No dedicated follow-up is  required.     LESTER J FAHRNER, MD    Patient has had this pain 4 times in her life in the epigastric area.  The worst time was when the lipase was elevated.  Had pork and sausage.  Woke up the next morning with the pain.  Standing up made it worse, and laying down better.  Took a few days for it to go away.  Lipase was drawn 3 days after her symptom started.             Assessment/Plan:  Patient is doing better with the Prilosec.  So very good chance the stomach is the problem with some gastritis.  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     She had a fatty meal that was larger than usual and then had the worse epigastric pain ever.  She was seen 3 days later and had a mildly elevated lipase.   This could have been much higher the day she had the pain.   She does not drink alcohol so the chance of this being gallstone pancreatitis is high.   Her symptom seem to come back when she was off the Prilosec and resolved when back on it.   So gastritis, is very likely going on but this may have started with the pancrease being inflamed.   Her ultrasound did not show any stones, but have seen normal gallbladder and still has some sludge that can cause this problem. So if she does well with the Prilosec will watch.  If she has the symptoms again after eating fatty foods can do HIDA scan, but may be inclined to just remove the gallbladder.   Her ct scan was normal and I looked at it to see if the gallbladder lit up and it did not look like that.   The path report did not show h pylori.  Was confusing that the no was on a different line.  So encouraged her to do the stool sample for h pylori and if positive would treat for that.       Phone call duration:  45 minutes    Humberto Faith  MD [place]     View Image    Component  Collected  Lab    Upper GI Endoscopy  03/05/2020 10:50 AM  [facility]    [facility]   Endoscopy Department-[place]   _______________________________________________________________________________   Patient Name: Alexandra Ramírez         Procedure Date: 3/5/2020 10:50 AM   MRN: [id number]                       YOB: 1995   Admit Type: Outpatient                Age: 24   Gender: Female                        Note Status: Finalized   Attending MD: Humberto Weston MD     Instrument Name: GIF- [id number]   _______________________________________________________________________________       Procedure:                Upper GI endoscopy   Indications:              Epigastric abdominal pain, Abdominal pain in the                             right upper quadrant   Providers:                Humberto Weston MD, Jacquie David RN   Referring MD:             Александр Graf MD   Medicines:                Fentanyl 200 micrograms IV, Midazolam 6 mg IV   Complications:            No immediate complications.   _______________________________________________________________________________   Procedure:                Pre-Anesthesia Assessment:                             - Prior to the procedure, a History and Physical                             was performed, and patient medications and                             allergies were reviewed. The risks and benefits of                             the procedure and the sedation options and risks                             were discussed with the patient. All questions were                             answered and informed consent was obtained. Patient                             identification and proposed procedure were verified                             by the physician in the pre-procedure area. Mental                             Status Examination: alert and oriented. Airway                              Examination: normal oropharyngeal airway and neck                             mobility. Respiratory Examination: clear to                             auscultation. CV Examination: normal. Prophylactic                             Antibiotics: The patient does not require                             prophylactic antibiotics. Prior Anticoagulants: The                             patient has taken no previous anticoagulant or                             antiplatelet agents. ASA Grade Assessment: II - A                             patient with mild systemic disease. After reviewing                             the risks and benefits, the patient was deemed in                             satisfactory condition to undergo the procedure.                             The anesthesia plan was to use moderate sedation /                             analgesia (conscious sedation). This assessment was                             completed before the administration of sedation at                             10:47 AM.                             After obtaining informed consent, the endoscope was                             passed under direct vision. Throughout the                             procedure, the patient's blood pressure, pulse, and                             oxygen saturations were monitored continuously. The                             was introduced through the mouth, and advanced to                             the second part of duodenum. The upper GI endoscopy                             was accomplished without difficulty. The patient                             tolerated the procedure well.                                                                                     Findings:        The examined esophagus was normal.        Diffuse mildly erythematous mucosa without bleeding was found in the        stomach. Biopsies were taken with a cold forceps for Helicobacter pylori        testing.        The  ampulla, duodenal bulb, first portion of the duodenum and second        portion of the duodenum were normal.        The exam was otherwise without abnormality.                                                                                     Moderate Sedation:        Moderate (conscious) sedation was administered by the endoscopy nurse        and supervised by the endoscopist. The following parameters were        monitored: oxygen saturation, heart rate, blood pressure, and response        to care. Total physician intraservice time was 15 minutes.   Impression:               - Normal esophagus.                             - Erythematous mucosa in the stomach. Biopsied.                             - Normal ampulla, duodenal bulb, first portion of                             the duodenum and second portion of the duodenum.                             - The examination was otherwise normal.   Recommendation:           - Your EGD went well. I did take biopsies of your                             stomach. Your stomach does show some inflammation,                             no ulcers. Your esophagus looks normal. Please                             continue your Prilosec (or Prevacid, Aciphex,                             Nexium, Protonix or whatever medicine you take for                             your stomach or for reflux).                             Please call [phone] to set up an appointment                             in 1 to 2 weeks to go over the biopsy results. Or                             wait for a letter with the biopsy results in 3-4                             weeks. Please call [phone], if after 5 weeks                             you did not receive your letter.                             There is no evidence of any cancer.                             You should avoid alcohol, Motrin, Advil, ibuprofen                             and aspirin as these can irritate the stomach. For                              reflux, eating small meals, losing weight, and not                             eating several hours before bedtime will help. Also                             avoiding alcohol, tobacco, chocolate, peppermint                             and caffeine will help your reflux symptoms.                             I have ordered an ultrasound for your gallbladder                             and will see what that shows and the biopsies of                             your stomach.                             If you can get this done in the next week then call                             when you have this set up to see me at least one                             day from your ultrasound and I can see you in                             clinic to talk about this.                             Call  to get an appointment.                             Thanks                             Humberto Weston MD                                                                                       ___________________   Humberto Weston MD

## 2020-03-27 NOTE — LETTER
[facility]           [address] [place] [address]           Tel [phone]  Alexandra Ramírez  [address]  [place] [address]      March 27, 2020    Dear Alexandra,  This letter is to recap what we talked about.  If you have questions please feel free to call my assistant  At [phone] .  Assessment/Plan:  Patient is doing better with the Prilosec.  So very good chance the stomach is the problem with some gastritis.  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     She had a fatty meal that was larger than usual and then had the worse epigastric pain ever.  She was seen 3 days later and had a mildly elevated lipase.   This could have been much higher the day she had the pain.   She does not drink alcohol so the chance of this being gallstone pancreatitis is high.   Her symptom seem to come back when she was off the Prilosec and resolved when back on it.   So gastritis, is very likely going on but this may have started with the pancrease being inflamed.   Her ultrasound did not show any stones, but have seen normal gallbladder and still has some sludge that can cause this problem. So if she does well with the Prilosec will watch.  If she has the symptoms again after eating fatty foods can do HIDA scan, but may be inclined to just remove the gallbladder.   Her ct scan was normal and I looked at it to see if the gallbladder lit up and it did not look like that.   The path report did not show h pylori.  Was confusing that the no was on a different line.  So encouraged her to do the stool sample for h pylori and if positive would treat for that.   If you do have further questions please don t hesitate to call my assistant at  .  We do not have someone answering the phone all the time at my assistants number so if leave a message may take a day or so to get back to you.  So if more urgent then call  the below number.    To make an appointment call [phone]: .   Sincerely,     Humberto Weston M.D.  ___

## 2020-06-05 ENCOUNTER — VIRTUAL VISIT (OUTPATIENT)
Dept: OBGYN | Facility: CLINIC | Age: 25
End: 2020-06-05
Payer: COMMERCIAL

## 2020-06-05 DIAGNOSIS — N97.0 ANOVULATION: ICD-10-CM

## 2020-06-05 PROCEDURE — 99212 OFFICE O/P EST SF 10 MIN: CPT | Mod: 95 | Performed by: NURSE PRACTITIONER

## 2020-06-05 RX ORDER — DROSPIRENONE AND ETHINYL ESTRADIOL 0.03MG-3MG
1 KIT ORAL DAILY
Qty: 84 TABLET | Refills: 1 | Status: SHIPPED | OUTPATIENT
Start: 2020-06-05 | End: 2021-04-05

## 2020-06-05 NOTE — PROGRESS NOTES
"Alexandra Ramírez is a 25 year old female who is being evaluated via a billable telephone visit.      The patient has been notified of following:     \"This telephone visit will be conducted via a call between you and your physician/provider. We have found that certain health care needs can be provided without the need for a physical exam.  This service lets us provide the care you need with a short phone conversation.  If a prescription is necessary we can send it directly to your pharmacy.  If lab work is needed we can place an order for that and you can then stop by our lab to have the test done at a later time.    Telephone visits are billed at different rates depending on your insurance coverage. During this emergency period, for some insurers they may be billed the same as an in-person visit.  Please reach out to your insurance provider with any questions.    If during the course of the call the physician/provider feels a telephone visit is not appropriate, you will not be charged for this service.\"    Patient has given verbal consent for Telephone visit?  Yes    What phone number would you like to be contacted at? [phone]    Subjective     Alexandra Ramírez is a 25 year old female who requested a telephone visit today for the following health issues:    HPI   Contraception    Patient has a history of anovulatory cycles and has been on Barbara In the past to keep cycles regular. They had been contemplating pregnancy and she was seen at [facility] to start work up. Patient did one round of Femara in March, had to do progesterone withdrawal at the end of that cycle and no menses since. They have decided to hold off on further rounds of treatment until the fall of this year.  She would like to restart her oral contraceptive pills to keep her cycles regular and prevent an unintentional pregnancy.  No changes in her medical health. Does not want anything long term as they " plan to start trying later this year. Did well on the Barbara and desires to restart the same medication.     Patient Active Problem List   Diagnosis     Irregular menses-Anovulation     Chronic thoracic spine pain     Generalized hyperhidrosis     Epigastric pain     Elevated lipase     Past Surgical History:   Procedure Laterality Date     C ORAL SURGERY PROCEDURE      [place] Teeth-bottom 2 only     COMBINED ESOPHAGOSCOPY, GASTROSCOPY, DUODENOSCOPY (EGD) WITH CO2 INSUFFLATION N/A 3/5/2020    Procedure: ESOPHAGOGASTRODUODENOSCOPY, WITH CO2 INSUFFLATION;  Surgeon: Humberto Weston MD;  Location: [facility] OR     ESOPHAGOSCOPY, GASTROSCOPY, DUODENOSCOPY (EGD), COMBINED N/A 3/5/2020    Procedure: Esophagogastroduodenoscopy, With Biopsy;  Surgeon: Humberto Weston MD;  Location: [facility] OR       Social History     Tobacco Use     Smoking status: Never Smoker     Smokeless tobacco: Never Used   Substance Use Topics     Alcohol use: No     Alcohol/week: 0.0 standard drinks     Family History   Problem Relation Age of Onset     Hypertension Maternal Grandmother            Reviewed and updated as needed this visit by Provider  Tobacco  Allergies  Meds  Med Hx  Surg Hx  Fam Hx  Soc Hx        Review of Systems   Constitutional, HEENT, cardiovascular, pulmonary, gi and gu systems are negative, except as otherwise noted.        Assessment & Plan     1. Anovulation  We discussed options and she would like to continue Barbara. We discussed when to start the pill, taking it at the same time every day, possible side effects she may experience, and use of barrier method to protect against STDs. Will send prescription for 6 months. At that time if they choose to start pursuing pregnancy, will follow up with [facility]. If they decide to hold off, will be seen in clinic for preventative exam and pap smear. Patient is given an opportunity to ask questions and have them answered.  - drospirenone-ethinyl  estradiol (OCELLA) 3-0.03 MG tablet; Take 1 tablet by mouth daily  Dispense: 84 tablet; Refill: 1     Phone call duration: 6 minutes     BRAD [facility]

## 2020-09-03 ENCOUNTER — OFFICE VISIT (OUTPATIENT)
Dept: OBGYN | Facility: CLINIC | Age: 25
End: 2020-09-03
Payer: COMMERCIAL

## 2020-09-03 VITALS
SYSTOLIC BLOOD PRESSURE: 114 MMHG | TEMPERATURE: 98.4 F | DIASTOLIC BLOOD PRESSURE: 77 MMHG | HEART RATE: 91 BPM | WEIGHT: 127.44 LBS | BODY MASS INDEX: 21.87 KG/M2

## 2020-09-03 DIAGNOSIS — B37.31 CANDIDIASIS OF VAGINA: ICD-10-CM

## 2020-09-03 DIAGNOSIS — N89.8 VAGINAL DISCHARGE: Primary | ICD-10-CM

## 2020-09-03 DIAGNOSIS — Z12.4 SCREENING FOR MALIGNANT NEOPLASM OF CERVIX: ICD-10-CM

## 2020-09-03 LAB
SPECIMEN SOURCE: ABNORMAL
WET PREP SPEC: ABNORMAL

## 2020-09-03 PROCEDURE — 87210 SMEAR WET MOUNT SALINE/INK: CPT | Performed by: ADVANCED PRACTICE MIDWIFE

## 2020-09-03 PROCEDURE — 99213 OFFICE O/P EST LOW 20 MIN: CPT | Performed by: ADVANCED PRACTICE MIDWIFE

## 2020-09-03 PROCEDURE — G0145 SCR C/V CYTO,THINLAYER,RESCR: HCPCS | Performed by: ADVANCED PRACTICE MIDWIFE

## 2020-09-03 RX ORDER — OMEPRAZOLE 10 MG/1
20 CAPSULE, DELAYED RELEASE ORAL DAILY
COMMUNITY

## 2020-09-03 RX ORDER — FLUCONAZOLE 150 MG/1
150 TABLET ORAL
Qty: 3 TABLET | Refills: 0 | Status: SHIPPED | OUTPATIENT
Start: 2020-09-03

## 2020-09-03 NOTE — PROGRESS NOTES
Alexandra Ramírez is a 25 year old who presents to the clinic for evaluation of vaginal changes.  For the last week or so has had an increase in vaginal discharge, some odor and is worried because the discharge is green      Vaginal Symptoms      Onset week or so number of episodes of vaginitis in the past year 0    Description  vaginal discharge - curd-like and green, itching and odor    Intensity:  moderate    Accompanying signs and symptoms (fever/dysuria/abdominal or back pain): None    History  Sexually active: yes, single partner, contraception - oral contraceptives (combined)  Currently pregnant: no  Possibility of pregnancy: No  Recent antibiotic use: no  Diabetes: no  Precipitating or alleviating factors: None    Therapies tried and outcome: none   Outcome: NA         Histories reviewed and updated  Past Medical History:   Diagnosis Date     Anovulation      Past Surgical History:   Procedure Laterality Date     C ORAL SURGERY PROCEDURE      [place] Teeth-bottom 2 only     COMBINED ESOPHAGOSCOPY, GASTROSCOPY, DUODENOSCOPY (EGD) WITH CO2 INSUFFLATION N/A 3/5/2020    Procedure: ESOPHAGOGASTRODUODENOSCOPY, WITH CO2 INSUFFLATION;  Surgeon: Humberto Weston MD;  Location:  OR     ESOPHAGOSCOPY, GASTROSCOPY, DUODENOSCOPY (EGD), COMBINED N/A 3/5/2020    Procedure: Esophagogastroduodenoscopy, With Biopsy;  Surgeon: Humberto Weston MD;  Location:  OR     Social History     Socioeconomic History     Marital status: Single     Spouse name: Not on file     Number of children: Not on file     Years of education: Not on file     Highest education level: Not on file   Occupational History     Not on file   Social Needs     Financial resource strain: Not on file     Food insecurity     Worry: Not on file     Inability: Not on file     Transportation needs     Medical: Not on file     Non-medical: Not on file   Tobacco Use     Smoking status: Never Smoker     Smokeless tobacco: Never Used   Substance and  Sexual Activity     Alcohol use: No     Alcohol/week: 0.0 standard drinks     Drug use: No     Sexual activity: Yes     Partners: Male     Birth control/protection: Condom, Pill   Lifestyle     Physical activity     Days per week: Not on file     Minutes per session: Not on file     Stress: Not on file   Relationships     Social connections     Talks on phone: Not on file     Gets together: Not on file     Attends Bahai service: Not on file     Active member of club or organization: Not on file     Attends meetings of clubs or organizations: Not on file     Relationship status: Not on file     Intimate partner violence     Fear of current or ex partner: Not on file     Emotionally abused: Not on file     Physically abused: Not on file     Forced sexual activity: Not on file   Other Topics Concern     Parent/sibling w/ CABG, MI or angioplasty before 65F 55M? Not Asked   Social History Narrative     Not on file     Family History   Problem Relation Age of Onset     Hypertension Maternal Grandmother             ROS: 10 point ROS neg other than the symptoms noted above in the HPI.    EXAM:  /77 (BP Location: Right arm, Patient Position: Sitting, Cuff Size: Adult Regular)   Pulse 91   Temp 98.4  F (36.9  C) (Tympanic)   Wt 57.8 kg (127 lb 7 oz)   LMP  (LMP Unknown)   BMI 21.87 kg/m    Patient appears well, vital signs normal.   Abdomen normal, soft without tenderness, guarding, mass or organomegaly.  No inguinal adenopathy or CVA tenderness.    : PELVIC EXAM:  Vulva: No external lesions, normal hair distribution, no adenopathy, BUS WNL  Vagina: Moist, pink, moderate to large amount of yeast like  discharge, well rugated, no lesions  Cervix: smooth, pink, no visible lesions, neg CMT  Uterus: Normal size, anteverted, non-tender, mobile  Ovaries: No mass, non-tender, mobile  Rectal exam: deferred    WET PREP: monilia       ASSESSMENT:   yeast.      PLAN:    Birth control method reviewed.  Abstain from  intercourse for duration of treatment.  Return if symptoms do not resolve as anticipated.  We also reviewed her PCOS.  Planning a pregnancy perhaps next year.

## 2020-09-11 LAB
COPATH REPORT: NORMAL
PAP: NORMAL

## 2020-12-06 ENCOUNTER — HEALTH MAINTENANCE LETTER (OUTPATIENT)
Age: 25
End: 2020-12-06

## 2021-01-15 ENCOUNTER — MYC MEDICAL ADVICE (OUTPATIENT)
Dept: OBGYN | Facility: CLINIC | Age: 26
End: 2021-01-15

## 2021-01-15 DIAGNOSIS — N90.89 VULVAR IRRITATION: ICD-10-CM

## 2021-01-15 DIAGNOSIS — R21 RASH AND NONSPECIFIC SKIN ERUPTION: Primary | ICD-10-CM

## 2021-01-15 RX ORDER — NYSTATIN AND TRIAMCINOLONE ACETONIDE 100000; 1 [USP'U]/G; MG/G
OINTMENT TOPICAL 2 TIMES DAILY
Qty: 30 G | Refills: 0 | Status: SHIPPED | OUTPATIENT
Start: 2021-01-15

## 2021-01-15 NOTE — TELEPHONE ENCOUNTER
Pt last seen 9/3/2020 for vaginal discharge. Pt currently having rash on her buttocks with slight spreading , past 6 days, denies vaginal concerns.    Pt states rash is red, raised and has spread a couple inches. Pt has had some relief with OTC hydrocortisone cream.    Pt asking for nystatin prescription she has received in past, RN unable to note when this was prescribed. RN routing to provider for advisement or for pt to f/u with FP.    Nikia Galvan RN on 1/15/2021 at 2:47 PM

## 2021-03-31 DIAGNOSIS — N97.0 ANOVULATION: ICD-10-CM

## 2021-03-31 NOTE — TELEPHONE ENCOUNTER
"Requested Prescriptions   Pending Prescriptions Disp Refills     drospirenone-ethinyl estradiol (JOSE) 3-0.03 MG tablet [Pharmacy Med Name: DROSPIRENONE-ETHINYL ES 3-0.03 TABS] 84 tablet 1     Sig: TAKE ONE TABLET BY MOUTH ONCE DAILY       Contraceptives Protocol Passed - 3/31/2021 10:31 AM        Passed - Patient is not a current smoker if age is 35 or older        Passed - Recent (12 mo) or future (30 days) visit within the authorizing provider's specialty     Patient has had an office visit with the authorizing provider or a provider within the authorizing providers department within the previous 12 mos or has a future within next 30 days. See \"Patient Info\" tab in inbasket, or \"Choose Columns\" in Meds & Orders section of the refill encounter.              Passed - Medication is active on med list        Passed - No active pregnancy on record        Passed - No positive pregnancy test in past 12 months           Pt last seen 9/3/2020 for vaginal concerns.    Last prescribed 6/5/2020 for 84 tablets with 1 refill, planned to f/u in 6 months if desires pregnancy or continue medication.    RN sent [employer] message to f/u.    Nikia Galvan RN on 3/31/2021 at 10:40 AM        "

## 2021-04-02 NOTE — TELEPHONE ENCOUNTER
Unable to reach patient via phone. RN left a message and instructed patient to call the clinic at [phone].    Nikia Galvan RN on 4/2/2021 at 11:01 AM

## 2021-04-05 RX ORDER — DROSPIRENONE AND ETHINYL ESTRADIOL 0.03MG-3MG
KIT ORAL
Qty: 84 TABLET | Refills: 0 | Status: SHIPPED | OUTPATIENT
Start: 2021-04-05

## 2021-04-05 NOTE — TELEPHONE ENCOUNTER
Pt returned call stating she is not wanting to try for conception yet, pt asked for 3 month refill.    Prescription approved per [facility] Refill Protocol.    RN sent 3 month refill.    Patient verbalized understanding and agreed to plan.     Nikia Galvan RN on 4/5/2021 at 9:09 AM

## 2021-04-11 ENCOUNTER — HEALTH MAINTENANCE LETTER (OUTPATIENT)
Age: 26
End: 2021-04-11

## 2021-09-26 ENCOUNTER — HEALTH MAINTENANCE LETTER (OUTPATIENT)
Age: 26
End: 2021-09-26

## 2022-05-07 ENCOUNTER — HEALTH MAINTENANCE LETTER (OUTPATIENT)
Age: 27
End: 2022-05-07

## 2023-01-08 ENCOUNTER — HEALTH MAINTENANCE LETTER (OUTPATIENT)
Age: 28
End: 2023-01-08

## 2023-06-02 ENCOUNTER — HEALTH MAINTENANCE LETTER (OUTPATIENT)
Age: 28
End: 2023-06-02

## (undated) DEVICE — SOL WATER IRRIG 1000ML BOTTLE 07139-09

## (undated) DEVICE — PREP CHLORAPREP 26ML TINTED ORANGE  260815

## (undated) RX ORDER — ONDANSETRON 4 MG/1
TABLET, ORALLY DISINTEGRATING ORAL
Status: DISPENSED
Start: 2020-03-05

## (undated) RX ORDER — FENTANYL CITRATE 50 UG/ML
INJECTION, SOLUTION INTRAMUSCULAR; INTRAVENOUS
Status: DISPENSED
Start: 2020-03-05